# Patient Record
Sex: MALE | URBAN - NONMETROPOLITAN AREA
[De-identification: names, ages, dates, MRNs, and addresses within clinical notes are randomized per-mention and may not be internally consistent; named-entity substitution may affect disease eponyms.]

---

## 2019-05-23 ENCOUNTER — DOCTOR'S OFFICE (OUTPATIENT)
Dept: URBAN - NONMETROPOLITAN AREA CLINIC 1 | Facility: CLINIC | Age: 49
Setting detail: OPHTHALMOLOGY
End: 2019-05-23
Payer: MEDICARE

## 2019-05-23 DIAGNOSIS — H25.13: ICD-10-CM

## 2019-05-23 DIAGNOSIS — E11.9: ICD-10-CM

## 2019-05-23 DIAGNOSIS — H53.123: ICD-10-CM

## 2019-05-23 DIAGNOSIS — H43.813: ICD-10-CM

## 2019-05-23 DIAGNOSIS — H43.812: ICD-10-CM

## 2019-05-23 DIAGNOSIS — H43.811: ICD-10-CM

## 2019-05-23 PROCEDURE — 92134 CPTRZ OPH DX IMG PST SGM RTA: CPT | Performed by: OPHTHALMOLOGY

## 2019-05-23 PROCEDURE — 92004 COMPRE OPH EXAM NEW PT 1/>: CPT | Performed by: OPHTHALMOLOGY

## 2019-05-23 PROCEDURE — 92225 OPHTHALMOSCOPY EXTENDED INITIAL: CPT | Performed by: OPHTHALMOLOGY

## 2019-05-23 PROCEDURE — 92226 OPHTHALMOSCOPY EXT SUBSEQUENT: CPT | Performed by: OPHTHALMOLOGY

## 2019-05-23 ASSESSMENT — VISUAL ACUITY
OD_BCVA: 20/20
OS_BCVA: 20/20

## 2019-05-23 ASSESSMENT — REFRACTION_CURRENTRX
OS_OVR_VA: 20/
OD_OVR_VA: 20/
OD_OVR_VA: 20/
OS_OVR_VA: 20/
OS_OVR_VA: 20/
OD_OVR_VA: 20/

## 2019-05-23 ASSESSMENT — REFRACTION_MANIFEST
OD_VA2: 20/
OS_VA1: 20/
OS_VA3: 20/
OS_VA2: 20/
OD_VA1: 20/
OS_VA2: 20/
OD_VA1: 20/
OS_VA3: 20/
OU_VA: 20/
OS_VA1: 20/
OD_VA3: 20/
OD_VA3: 20/
OD_VA2: 20/
OU_VA: 20/

## 2019-05-23 ASSESSMENT — CONFRONTATIONAL VISUAL FIELD TEST (CVF)
OD_FINDINGS: FULL
OS_FINDINGS: FULL

## 2021-04-08 DIAGNOSIS — Z23 ENCOUNTER FOR IMMUNIZATION: ICD-10-CM

## 2021-09-16 ENCOUNTER — DOCTOR'S OFFICE (OUTPATIENT)
Dept: URBAN - NONMETROPOLITAN AREA CLINIC 1 | Facility: CLINIC | Age: 51
Setting detail: OPHTHALMOLOGY
End: 2021-09-16
Payer: COMMERCIAL

## 2021-09-16 DIAGNOSIS — H25.13: ICD-10-CM

## 2021-09-16 DIAGNOSIS — H43.813: ICD-10-CM

## 2021-09-16 DIAGNOSIS — E11.9: ICD-10-CM

## 2021-09-16 DIAGNOSIS — H53.123: ICD-10-CM

## 2021-09-16 PROCEDURE — 92134 CPTRZ OPH DX IMG PST SGM RTA: CPT | Performed by: OPHTHALMOLOGY

## 2021-09-16 PROCEDURE — 92201 OPSCPY EXTND RTA DRAW UNI/BI: CPT | Performed by: OPHTHALMOLOGY

## 2021-09-16 PROCEDURE — 92014 COMPRE OPH EXAM EST PT 1/>: CPT | Performed by: OPHTHALMOLOGY

## 2021-09-16 ASSESSMENT — VISUAL ACUITY
OS_BCVA: 20/20-1
OD_BCVA: 20/20

## 2021-09-16 ASSESSMENT — CONFRONTATIONAL VISUAL FIELD TEST (CVF)
OS_FINDINGS: FULL
OD_FINDINGS: FULL

## 2023-06-14 ENCOUNTER — CONSULT (OUTPATIENT)
Dept: PAIN MEDICINE | Facility: CLINIC | Age: 53
End: 2023-06-14
Payer: COMMERCIAL

## 2023-06-14 VITALS
DIASTOLIC BLOOD PRESSURE: 84 MMHG | HEART RATE: 80 BPM | SYSTOLIC BLOOD PRESSURE: 128 MMHG | WEIGHT: 278.6 LBS | TEMPERATURE: 97.2 F | RESPIRATION RATE: 20 BRPM | BODY MASS INDEX: 39 KG/M2 | HEIGHT: 71 IN

## 2023-06-14 DIAGNOSIS — E11.9 TYPE 2 DIABETES MELLITUS WITHOUT COMPLICATION, WITHOUT LONG-TERM CURRENT USE OF INSULIN (HCC): ICD-10-CM

## 2023-06-14 DIAGNOSIS — M54.12 CERVICAL RADICULOPATHY: Primary | ICD-10-CM

## 2023-06-14 PROCEDURE — 99204 OFFICE O/P NEW MOD 45 MIN: CPT | Performed by: ANESTHESIOLOGY

## 2023-06-14 RX ORDER — ATORVASTATIN CALCIUM 10 MG/1
10 TABLET, FILM COATED ORAL
COMMUNITY
Start: 2023-04-12

## 2023-06-14 RX ORDER — OXYBUTYNIN CHLORIDE 10 MG/1
10 TABLET, EXTENDED RELEASE ORAL
COMMUNITY
Start: 2023-04-12

## 2023-06-14 RX ORDER — METOPROLOL SUCCINATE 50 MG/1
1.5 TABLET, EXTENDED RELEASE ORAL DAILY
COMMUNITY
Start: 2023-04-12

## 2023-06-14 RX ORDER — VALSARTAN 320 MG/1
1 TABLET ORAL DAILY
COMMUNITY
Start: 2023-04-13

## 2023-06-14 RX ORDER — AMLODIPINE BESYLATE 5 MG/1
5 TABLET ORAL DAILY
COMMUNITY
Start: 2023-04-12 | End: 2024-04-11

## 2023-06-14 RX ORDER — LISINOPRIL 10 MG/1
TABLET ORAL
COMMUNITY
Start: 2023-03-14

## 2023-06-14 RX ORDER — PANTOPRAZOLE SODIUM 40 MG/1
1 TABLET, DELAYED RELEASE ORAL EVERY EVENING
COMMUNITY
Start: 2023-04-12

## 2023-06-14 RX ORDER — GABAPENTIN 300 MG/1
300 CAPSULE ORAL 3 TIMES DAILY
Qty: 90 CAPSULE | Refills: 2 | Status: SHIPPED | OUTPATIENT
Start: 2023-06-14

## 2023-06-14 RX ORDER — DULOXETIN HYDROCHLORIDE 60 MG/1
60 CAPSULE, DELAYED RELEASE ORAL DAILY
COMMUNITY
Start: 2023-04-12

## 2023-06-14 NOTE — PROGRESS NOTES
Assessment  1  Cervical radiculopathy - Left  -     Ambulatory referral to Physical Therapy; Future  -     gabapentin (NEURONTIN) 300 mg capsule; Take 1 capsule (300 mg total) by mouth 3 (three) times a day  -     MRI cervical spine without contrast; Future; Expected date: 06/14/2023    2  Type 2 diabetes mellitus without complication, without long-term current use of insulin (HCC)    Left, greater than right sided cervical radicular pain in C6 and C7 dermatomal distribution accompanied by pain limited weakness numbness and paresthesias  Patient has not yet been a full participant with PT recently  Chronic pain with decreased participation with IADLs over the past few months  Has been taking NSAIDs and tylenol in addition to cymbalta with modest benefit  5/5 strength bilaterally in upper extremities with AROM, positive spurling's maneuver left sided  Additionally there is positive cervical facet loading eliciting pain, left greater than right  Negative España's, denies any gait instability, saddle anesthesia  CT cervical spine from 2015 reviewed showing mild multilevel spondylotic changes  There is bilateral neural foraminal narrowing at C5-6 as a result of uncovertebral joint hypertrophy  No cord compression or cord signal abnormality  Risks, benefits alternatives to epidural steroid injections thoroughly discussed with patient  Handouts provided questions answered to patient's satisfaction  Lifestyle modifications extensively discussed including sleep hygiene, neck posture, diet, exercise and weight loss in conjunction with PT    Will proceed with multimodal pain therapy plan as noted below:    Plan  -MRI cervical spine noncontrast; will f/u result with patient (pacemaker ICD that is MRI conditional as per cards presented) should present cards to MRI tech on day of procedure to verify this  -gabapentin 300 mg t i d  Ordered for patient; counseled regarding sedative effects of taking this medication and provided up titration calendar  Counseled not to take medication while driving or operating heavy machinery/using stairs  -script provided for formal physical therapy for left sided cervical radiculopathy; Physician directed home exercise plan as per [employer] demonstrated and handouts provided that patient plans to participate with for 1 hour, twice a week for the next 6 weeks  There are risks associated with opioid medications, including dependence, addiction and tolerance  The patient understands and agrees to use these medications only as prescribed  Potential side effects of the medications include, but are not limited to, constipation, drowsiness, addiction, impaired judgment and risk of fatal overdose if not taken as prescribed  The patient was warned against driving while taking sedation medications or operating heavy machinery  The patient voiced understanding  Sharing medications is a felony  At this point in time, the patient is showing no signs of addiction, abuse, diversion or suicidal ideation  [place] Prescription Drug Monitoring Program report was reviewed and was appropriate      Complete risks and benefits including bleeding, infection, tissue reaction, nerve injury and allergic reaction were discussed  The approach was demonstrated using models and literature was provided  Verbal and written consent was obtained  My impressions and treatment recommendations were discussed in detail with the patient who verbalized understanding and had no further questions  Discharge instructions were provided  I personally saw and examined the patient and I agree with the above discussed plan of care      New Medications Ordered This Visit   Medications   • Multiple Vitamins-Minerals (MULTIVITAMIN ADULT EXTRA C PO)     Sig: Take by mouth   • Lansoprazole (PREVACID PO)     Sig: one daily   • TURMERIC PO     Sig: Take 500 mg by mouth 2 (two) times a day   • VITAMIN E PO     Sig: Take 400 Units by mouth • amLODIPine (NORVASC) 5 mg tablet     Sig: Take 5 mg by mouth daily   • atorvastatin (LIPITOR) 10 mg tablet     Sig: Take 10 mg by mouth   • metoprolol succinate (TOPROL-XL) 50 mg 24 hr tablet     Si 5 tablets daily   • valsartan (Diovan) 320 MG tablet     Sig: Take 1 tablet by mouth daily   • DULoxetine (CYMBALTA) 60 mg delayed release capsule     Sig: Take 60 mg by mouth daily   • metFORMIN (GLUCOPHAGE) 500 mg tablet     Sig: Take 1 tablet by mouth 2 (two) times a day with meals   • pantoprazole (PROTONIX) 40 mg tablet     Sig: Take 1 tablet by mouth every evening   • oxybutynin (DITROPAN-XL) 10 MG 24 hr tablet     Sig: Take 10 mg by mouth   • cyanocobalamin (VITAMIN B-12) 500 MCG tablet     Sig: Take 500 mcg by mouth daily   • lisinopril (ZESTRIL) 10 mg tablet   • gabapentin (NEURONTIN) 300 mg capsule     Sig: Take 1 capsule (300 mg total) by mouth 3 (three) times a day     Dispense:  90 capsule     Refill:  2       History of Present Illness    Fred Cordero is a 45 y o  male with pmhx of cardiomyopathy with pacemaker ICD, DM-2 (noninsulin dependent), HTN, XOL presenting with left sided cervical radicular pain in C6 and C7 dermatomal distribution accompanied by pain limited weakness numbness and paresthesias  Patient has not been a full participant with PT  Chronic pain with decreased participation with IADLs over the past few months  Has been taking NSAIDs and tylenol infrequently with modest benefit  The pain contributes to significant disability in participation with independent activities of daily living and is accompanied by pain limited weakness numbness and paresthesias that are debilitating in nature  The patient describes that overhead maneuvers such as combing hair is significantly limiting with respect to strength in the left arm/hand  He has trialed conservative measures including nsaids and tylenol for the pain but has not trialed any steroids or gabapentin    He has never had interventional pain procedures in the past including any cervical interlaminar epidural steroid injections in the past for his pain  He denies any saddle anesthesia, gait abnormality or bowel/bladder abnormality  I have personally reviewed and/or updated the patient's past medical history, past surgical history, family history, social history, current medications, allergies, and vital signs today  Review of Systems   Constitutional: Positive for activity change  HENT: Negative  Eyes: Negative  Respiratory: Negative  Cardiovascular: Negative  Gastrointestinal: Negative  Endocrine: Negative  Genitourinary: Negative  Musculoskeletal: Positive for arthralgias, myalgias, neck pain and neck stiffness  Negative for back pain and gait problem  Skin: Negative  Allergic/Immunologic: Negative  Neurological: Positive for weakness and numbness  Hematological: Negative  Psychiatric/Behavioral: Negative  All other systems reviewed and are negative  Patient Active Problem List   Diagnosis   • Cervical radiculopathy   • Type 2 diabetes mellitus without complication, without long-term current use of insulin ([facility] )       Past Medical History:   Diagnosis Date   • Cardiomyopathy ([facility] )    • GERD (gastroesophageal reflux disease)    • Pacemaker 2013   • Spinal stenosis        History reviewed  No pertinent surgical history      Family History   Problem Relation Age of Onset   • Colon cancer Mother    • Prostate cancer Father        Social History     Occupational History   • Not on file   Tobacco Use   • Smoking status: Never   • Smokeless tobacco: Never   Vaping Use   • Vaping Use: Never used   Substance and Sexual Activity   • Alcohol use: Yes     Comment: social   • Drug use: Never   • Sexual activity: Not on file       Current Outpatient Medications on File Prior to Visit   Medication Sig   • amLODIPine (NORVASC) 5 mg tablet Take 5 mg by mouth daily   • atorvastatin (LIPITOR) 10 mg "tablet Take 10 mg by mouth   • cyanocobalamin (VITAMIN B-12) 500 MCG tablet Take 500 mcg by mouth daily   • DULoxetine (CYMBALTA) 60 mg delayed release capsule Take 60 mg by mouth daily   • Lansoprazole (PREVACID PO) one daily   • metFORMIN (GLUCOPHAGE) 500 mg tablet Take 1 tablet by mouth 2 (two) times a day with meals   • metoprolol succinate (TOPROL-XL) 50 mg 24 hr tablet 1 5 tablets daily   • Multiple Vitamins-Minerals (MULTIVITAMIN ADULT EXTRA C PO) Take by mouth   • oxybutynin (DITROPAN-XL) 10 MG 24 hr tablet Take 10 mg by mouth   • pantoprazole (PROTONIX) 40 mg tablet Take 1 tablet by mouth every evening   • TURMERIC PO Take 500 mg by mouth 2 (two) times a day   • valsartan (Diovan) 320 MG tablet Take 1 tablet by mouth daily   • VITAMIN E PO Take 400 Units by mouth   • lisinopril (ZESTRIL) 10 mg tablet  (Patient not taking: Reported on 6/14/2023)     No current facility-administered medications on file prior to visit  Allergies   Allergen Reactions   • Cyclobenzaprine Anaphylaxis   • Lidocaine Anaphylaxis   • Penicillins Anaphylaxis and Hives   • Sulfa Antibiotics Anaphylaxis, Hives and Rash   • Tramadol Anaphylaxis and Angioedema   • Terbinafine Other (See Comments)     Transaminitis     Physical Exam    /84   Pulse 80   Temp (!) 97 2 °F (36 2 °C)   Resp 20   Ht 5' 11\" (1 803 m)   Wt 126 kg (278 lb 9 6 oz)   BMI 38 86 kg/m²     Constitutional: normal, well developed, well nourished, alert, in no distress and non-toxic and no overt pain behavior  and obese  Eyes: anicteric  HEENT: grossly intact  Neck: supple, symmetric, trachea midline and no masses   Pulmonary:even and unlabored  Cardiovascular:No edema or pitting edema present  Skin:Normal without rashes or lesions and well hydrated  Psychiatric:Mood and affect appropriate  Neurologic:Cranial Nerves II-XII grossly intact Sensation grossly intact; no clonus negative pena's  Reflexes 2+ and brisk   Spurling's maneuver positive left sided " Musculoskeletal:normal gait  5/5 strength bilaterally with AROM in upper extremities  Significant pain with cervical facet loading bilaterally and with lateral spine rotation, left greater than right  ttp over cervical paraspinal muscles  Negative derrek's test, negative gaenslen's negative SIJ loading bilaterally  Imaging    CT cervical spine noncontrast 2015    History: Cervical spine injury     Technique: Axial CT images of the cervical spine were performed without   intravenous contrast  Sagittal and coronal reconstructions were formatted  Comparison: None     Findings:  The craniocervical junction is intact  There is degenerative   narrowing of the predental space  The odontoid is intact  There is   straightening of the normal cervical lordosis  There is no subluxation  There   are mild multilevel spondylotic changes  There is bilateral neural foraminal   narrowing at C5-6 as a result of uncovertebral joint hypertrophy  CT is   suboptimal for evaluation of neural foramen and central canal, which can be   further evaluated with MRI  There is no prevertebral soft tissue swelling  No   fractures are identified

## 2023-06-14 NOTE — PATIENT INSTRUCTIONS
Neck Exercises   AMBULATORY CARE:   Neck exercises  help reduce neck pain, and improve neck movement and strength  Neck exercises also help prevent long-term neck problems  Call your doctor if:   Your pain does not get better, or gets worse  You have questions or concerns about your condition, care, or exercise program     What you need to know about exercise safety:   Move slowly, gently, and smoothly  Avoid fast or jerky motions  Stand and sit the way your healthcare provider shows you  Good posture may reduce your neck pain  Check your posture often, even when you are not doing your neck exercises  Follow the exercise program recommended by your healthcare provider  He or she will tell you which exercises are best for your condition  He or she will also tell you how many repetitions to do and how often you should do the exercises  How to perform neck exercises safely:   Exercise position:  You may sit or stand while you do neck exercises  Face forward  Your shoulders should be straight and relaxed, with a good posture  Head tilts, forward and back:  Gently bow your head and try to touch your chin to your chest  Your healthcare provider may tell you to push on the back of your neck to help bow your head  Raise your chin back to the starting position  Tilt your head back as far as possible so you are looking up at the ceiling  Your healthcare provider may tell you to lift your chin to help tilt your head back  Return your head to the starting position  Head tilts, side to side:  Tilt your head, bringing your ear toward your shoulder  Then tilt your head toward the other shoulder  Head turns:  Turn your head to look over your shoulder  Tilt your chin down and try to touch it to your shoulder  Do not raise your shoulder to your chin  Face forward again  Do the same on the other side           Head rolls:  Slowly bring your chin toward your chest  Next, roll your head to the right  Your ear should be positioned over your shoulder  Hold this position for 5 seconds  Roll your head back toward your chest and to the left into the same position  Hold for 5 seconds  Gently roll your head back and around in a clockwise [place] 3 times  Next, move your head in the reverse direction (counterclockwise) in a [place] 3 times  Do not shrug your shoulders upwards while you do this exercise  Follow up with your doctor as directed:  Write down your questions so you remember to ask them during your visits  © Copyright [employer] 2022 Information is for End User's use only and may not be sold, redistributed or otherwise used for commercial purposes  The above information is an  only  It is not intended as medical advice for individual conditions or treatments  Talk to your doctor, nurse or pharmacist before following any medical regimen to see if it is safe and effective for you

## 2023-06-29 ENCOUNTER — TELEPHONE (OUTPATIENT)
Dept: MRI IMAGING | Facility: HOSPITAL | Age: 53
End: 2023-06-29

## 2023-06-29 ENCOUNTER — TELEPHONE (OUTPATIENT)
Dept: PAIN MEDICINE | Facility: CLINIC | Age: 53
End: 2023-06-29

## 2023-06-29 DIAGNOSIS — M54.12 CERVICAL RADICULOPATHY: Primary | ICD-10-CM

## 2023-06-29 NOTE — TELEPHONE ENCOUNTER
Per [employer], MRI unsafe device  Sent in-basket message to ordering provider and left voicemail for pt

## 2023-06-29 NOTE — TELEPHONE ENCOUNTER
"----- Message from Anai Barkley sent at 6/28/2023  6:01 PM EDT -----  Hello,  You recently ordered an MRI for this patient who has a [employer] CIED  Per the , one of his leads is non-conditional, meaning it is not approved for MRI  I have canceled the order and flagged the patient's chart as \"MRI Unsafe\"  I left a voicemail for him to inform him of this       Thanks you,   -Cheryle Stagger R T  (MERVIN )(MR)TANISHA ARREOLA [facility] MR   [address] [facility]  [phone]      "

## 2023-07-07 ENCOUNTER — TELEPHONE (OUTPATIENT)
Dept: PAIN MEDICINE | Facility: CLINIC | Age: 53
End: 2023-07-07

## 2023-07-07 ENCOUNTER — PREP FOR PROCEDURE (OUTPATIENT)
Dept: PAIN MEDICINE | Facility: CLINIC | Age: 53
End: 2023-07-07

## 2023-07-07 DIAGNOSIS — M54.12 CERVICAL RADICULOPATHY: Primary | ICD-10-CM

## 2023-07-07 NOTE — TELEPHONE ENCOUNTER
Spoke with patient and scheduled C7-T1 ILESI. Patient aware of instructions. No food/drink one hour prior. Wear comfortable clothing. A  is required. Denies blood thinners. Continue all other prescribed medications on day of procedure. Call if prescribed an antibiotic or become sick. Refrain from vaccinations two weeks prior and two weeks after. Patient aware [facility] nurse will call day prior with instructions and report time. Call with questions.

## 2023-07-11 ENCOUNTER — EVALUATION (OUTPATIENT)
Dept: PHYSICAL THERAPY | Facility: CLINIC | Age: 53
End: 2023-07-11
Payer: COMMERCIAL

## 2023-07-11 DIAGNOSIS — M54.12 CERVICAL RADICULOPATHY: Primary | ICD-10-CM

## 2023-07-11 PROCEDURE — 97110 THERAPEUTIC EXERCISES: CPT | Performed by: PHYSICAL THERAPIST

## 2023-07-11 PROCEDURE — 97162 PT EVAL MOD COMPLEX 30 MIN: CPT | Performed by: PHYSICAL THERAPIST

## 2023-07-11 NOTE — PROGRESS NOTES
PT Evaluation     Today's date: 2023  Patient name: Miriam Terrazas  : 1970  MRN: [id number]  Referring provider: Tatyana Sanford MD  Dx:   Encounter Diagnosis     ICD-10-CM    1. Cervical radiculopathy - Left  M54.12 Ambulatory referral to Physical Therapy                     Assessment   Assessment details: Miriam Terrazas is a pleasant 45 y.o. male presenting to PT with cc of acute neck pain, stiffness, and LUE radicular symptoms. Pt. States pain began ~10 years and has been intermittent until recent worsening 3 months ago. Upon examination, patient was found to have objective deficits as listed below and is displaying ss consistent with c L sided C7 radiculopathy. Pt. Is experiencing subsequent functional deficits including difficulty difficulty working as , lifting and working overhead, and driving. Pt. Was educated on role of PT to address issues and given initial treatment focusing on AROM with HEP. Pt. Would benefit from skilled physical therapy to promote improved function and maximize activity tolerance. Symptom irritability: highUnderstanding of Dx/Px/POC: excellent  Goals  STG:   -Pt. Will demonstrate appropriate postural positioning to improve work task tolerance in 2 visits  -Pt. Will demonstrate subcranial retraction improvement of 25%  -Pt. Will demonstrate cervical B sidebending AROM 25%    LT weeks  -Pt. Will demonstrate scapular depression strength WNL  -Pt. Will demonstrate C/S AROM WNL  -Pt.  Will demonstrate I with HEP upon [address] details: Reduce soft tissue irritation/tension -> Improve AROM and postural awareness -> Improve scapular and cervical stabilization -> Improve functional performance focusing on appropriate technique/mechanics    Patient would benefit from: skilled physical therapy  Planned modality interventions: cryotherapy, high voltage pulsed current: spasm management, high voltage pulsed current: pain management, unattended electrical stimulation and thermotherapy: hydrocollator packs  Planned therapy interventions: abdominal trunk stabilization, activity modification, body mechanics training, flexibility, functional ROM exercises, graded exercise, home exercise program, therapeutic exercise, therapeutic activities, stretching, strengthening, postural training, patient education, neuromuscular re-education, manual therapy, joint mobilization and massage  Frequency: 1x week  Duration in weeks: 6  Plan of Care beginning date:23   Plan of Care expiration date: 2023  Treatment plan discussed with: patient         Subjective Evaluation     History of Present Illness  Mechanism of injury: Morteza Mortensen presents to PT with cc of neck pain and LUE radicular pain for past 3 months. He notes long standing history of intermittent radicular pain but most recent bout has been since April and is more severe than prior. He describes the pain as a nagging and is worsened with cervical extension. Denies headaches. Denies weakness. Notes n/t extends into R 1, 2, 3 digits. Pt. Recently began gabapentin. He is scheduled for TAYLOR with Dr. Sergey Jauregui in 1 week.    Pain  Current pain ratin  At best pain ratin  At worst pain rating: 10  Quality: tight, radiating, pulling, pressure, knife-like, grinding and discomfort  Relieving factors: ice, medications and heat  Aggravating factors: lifting  Progression: worsening     Social Support     Employment status: full time   Hand dominance: right     Diagnostic Tests  Mri and ct pending  Treatments  Previous treatment: chiro worsening  Current treatment: gabapentin  Patient Goals  Patient goals for therapy: decreased pain, increased motion, return to sport/leisure activities, independence with ADLs/IADLs and increased strength              Objective      Concurrent Complaints  Negative for disturbed sleep, dizziness, faints and headaches     Postural Observations  Seated posture: fair  Standing posture: fair           Tenderness   Cervical Spine   Tenderness in the spinous process. Neurological Testing      Sensation   Cervical/Thoracic   Left   Intact: light touch     Right   Intact: light touch     Reflexes   Left   Biceps (C5/C6): normal (2+)  Triceps (C7): normal (2+)     Right   Biceps (C5/C6): normal (2+)  Triceps (C7): normal (2+)     Active Range of Motion   Cervical/Thoracic Spine         Cervical     Subcranial retraction:  reduced approx 25%  Flexion:  wfl  Extension:  reduced approx 50% with worsening symptoms  Left lateral flexion:  reduced approx 25% wit hworsening symptoms  Right lateral flexion:  wfl  Left rotation:  wfl  Right rotation:  wfl     Joint Play     Hypomobile: C6, C7, T1, T2, T6 and T7     Pain: C6, C7, T1, T2, T6 and T7      Strength/Myotome Testing   Cervical Spine      Left   Normal strength     Right   Normal strength     Tests   Cervical   Positive vertical compression, cervical distraction,   Negative alar ligament test and transverse ligaent test.      Left Shoulder   Positive ULTT1.       Additional Tests Details  Minimal diaphragmatic breathing with increased scalene tone     General Comments:     Upper quarter screen   Shoulder: unremarkable  Elbow: unremarkable  Hand/wrist: unremarkable  Neuro Exam:      Headaches   Patient reports headaches: No.           Flowsheet Rows    Flowsheet Row Most Recent Value   PT/OT G-Codes    Current Score 63   Projected Score 70   FOTO information reviewed Yes             Precautions: Pacemaker No ESTIM     Daily Treatment Diary:      Initial Evaluation Date: 07/11/23  Compliance 7/11                     Visit Number 1                    Re-Eval  IE                 [address] [facility]                           7/11                     Manual                      CT mobz -                     Nerve gldies -                                           Ther-Ex                      C/s arom - mhp -                     butterflies 10x10"                     Arm lengthener -                     Prayer stretch 4x30"                     Doorway stretch 4x30"                     Open books 10x10"                                                                                       Neuro Re-Ed                                                                                                Ther-Act                                                               Modalities                      TRACTION  nv

## 2023-07-20 ENCOUNTER — OFFICE VISIT (OUTPATIENT)
Dept: PHYSICAL THERAPY | Facility: CLINIC | Age: 53
End: 2023-07-20
Payer: COMMERCIAL

## 2023-07-20 DIAGNOSIS — M54.12 CERVICAL RADICULOPATHY: Primary | ICD-10-CM

## 2023-07-20 PROCEDURE — 97110 THERAPEUTIC EXERCISES: CPT

## 2023-07-20 PROCEDURE — 97140 MANUAL THERAPY 1/> REGIONS: CPT

## 2023-07-20 NOTE — PROGRESS NOTES
Daily Note     Today's date: 2023  Patient name: Ivy Julio  : 1970  MRN: [id number]  Referring provider: Deepthi Agrawal MD  Dx:   Encounter Diagnosis     ICD-10-CM    1. Cervical radiculopathy - Left  M54.12                      Subjective: Patient reports 3/10 neck pain that goes down the L UE to the elbow. Denies tingling in finger tips today. He says he had quite a bit of soreness following his initial evaluation. He has ibng using ice and had some relief. He notes he was supposed to get injection on Tuesday, but insurance denied it last minute and says they would like to see 6-8 weeks of PT first.      Objective: See treatment diary below      Assessment: Tolerated treatment well without significant change in L UE radicular symptoms through exercises. Patient demonstrated significant tightness in periscapular region with MT. Some L UE symptom reduction following mechanical tx. Patient would benefit from continued PT to reduce cervical radicular symptoms for improved function and activity tolerance. Plan: Continue per plan of care.       Precautions: Pacemaker No ESTIM     Daily Treatment Diary:      Initial Evaluation Date: 23  Compliance                    Visit Number 1 2                   Re-Eval  IE                 [address] [facility] [address]                                              Manual                      CT mobz -  L periscap stm 8 min                   Nerve gldies -  5m                                         Ther-Ex                      C/s arom -  15x                   mhp -  np (prefers ice)                   butterflies 10x10"  10"x10                   Arm lengthener -  10"x10                   Prayer stretch 4x30" 4x30"                   Doorway stretch 4x30" 4x30"                   Open books 10x10"  10"x10                                                                                     Neuro Re-Ed Ther-Act                                                               Modalities                      TRACTION  nv 13# 15 min

## 2023-07-25 ENCOUNTER — OFFICE VISIT (OUTPATIENT)
Dept: PHYSICAL THERAPY | Facility: CLINIC | Age: 53
End: 2023-07-25
Payer: COMMERCIAL

## 2023-07-25 DIAGNOSIS — M54.12 CERVICAL RADICULOPATHY: Primary | ICD-10-CM

## 2023-07-25 PROCEDURE — 97110 THERAPEUTIC EXERCISES: CPT | Performed by: PHYSICAL THERAPIST

## 2023-07-25 PROCEDURE — 97140 MANUAL THERAPY 1/> REGIONS: CPT | Performed by: PHYSICAL THERAPIST

## 2023-07-25 NOTE — PROGRESS NOTES
Daily Note     Today's date: 2023  Patient name: Hoa De Leon  : 1970  MRN: [id number]  Referring provider: Henrry Sepulveda MD  Dx:   Encounter Diagnosis     ICD-10-CM    1. Cervical radiculopathy - Left  M54.12                      Subjective: Rich notes therapy helped reduce symptoms last session. Objective: See treatment diary below      Assessment: Tolerated treatment well. Patient demonstrated fatigue post treatment, exhibited good technique with therapeutic exercises and would benefit from continued PT      Plan: Continue per plan of care. Progress treatment as tolerated.        Precautions: Pacemaker No ESTIM     Daily Treatment Diary:      Initial Evaluation Date: 23  Compliance                  Visit Number 1 2  3                 Re-Eval  IE   -              MC   Foto Captured Y   -                                         Manual                      CT mobz -  L periscap stm 8 min                   Nerve gldies -  5m                                         Ther-Ex                      C/s arom -  15x  15x ea                 mhp -  np (prefers ice)  -                 butterflies 10x10"  10"x10  10x10"                 Arm lengthener -  10"x10  10x10"                 Prayer stretch 4x30" 4x30"  4x30"                 Doorway stretch 4x30" 4x30"  4x30"                 Open books 10x10"  10"x10  10x10"                 Chin tuck - - Supine 10x10"                                       ube - - 3'/3'                 Neuro Re-Ed                                                                                                Ther-Act                                                               Modalities                      TRACTION  nv 13# 15 min 18# 10'

## 2023-08-01 ENCOUNTER — OFFICE VISIT (OUTPATIENT)
Dept: PHYSICAL THERAPY | Facility: CLINIC | Age: 53
End: 2023-08-01
Payer: COMMERCIAL

## 2023-08-01 DIAGNOSIS — M54.12 CERVICAL RADICULOPATHY: Primary | ICD-10-CM

## 2023-08-01 PROCEDURE — 97110 THERAPEUTIC EXERCISES: CPT

## 2023-08-01 PROCEDURE — 97140 MANUAL THERAPY 1/> REGIONS: CPT

## 2023-08-01 NOTE — PROGRESS NOTES
Daily Note     Today's date: 2023  Patient name: Harvey Huitron  : 1970  MRN: [id number]  Referring provider: Thomas Wellington MD  Dx:   Encounter Diagnosis     ICD-10-CM    1. Cervical radiculopathy - Left  M54.12           Start Time: 1630  Stop Time: 1730  Total time in clinic (min): 60 minutes    Subjective: Rich notes therapy helped reduce symptoms last session. Objective: See treatment diary below      Assessment: Tolerated treatment well. Patient noted improved radicular sx during cervical retraction with increased reps. Patient had good carryover with posture and mechanics after cuing. He had visual improvement in cervical ROM post AROM exercises. Patient demonstrated fatigue post treatment, exhibited good technique with therapeutic exercises and would benefit from continued PT to decrease radic sx, increased postural strength and mobility as tolerated. Plan: Continue per plan of care. Progress treatment as tolerated.        Precautions: Pacemaker No ESTIM     Daily Treatment Diary:      Initial Evaluation Date: 23  Compliance                Visit Number 1 2  3  4               Re-Eval  IE   -  -            MC   Foto Captured Y   -  -                                     Manual                      CT mobz -  L periscap stm 8 min    KL               Nerve gldies -  5m    self 5"x10                                     Ther-Ex                      C/s arom -  15x  15x ea  w/chin tuc 15x all               mhp -  np (prefers ice)  -                 butterflies 10x10"  10"x10  10x10"  10x10"               Arm lengthener -  10"x10  10x10"                Prayer stretch 4x30" 4x30"  4x30"   4x30"               Doorway stretch 4x30" 4x30"  4x30"  4x30"               Open books 10x10"  10"x10  10x10"   10x10"               Chin tuck - - Supine 10x10"  seated 3"x20                                     ube - - 3'/3'  held radic sx    Wall slides as warm up 15x (pulleys in use)               Neuro Re-Ed                                                                                                Ther-Act                                                               Modalities                      TRACTION  nv 13# 15 min 18# 10' 20# 10"

## 2023-08-07 ENCOUNTER — TELEPHONE (OUTPATIENT)
Dept: PAIN MEDICINE | Facility: CLINIC | Age: 53
End: 2023-08-07

## 2023-08-07 NOTE — TELEPHONE ENCOUNTER
Called patient left voicemail that unfortunately they were unable to fill OR schedule and he needed to be moved up to early afternoon.

## 2023-08-08 ENCOUNTER — OFFICE VISIT (OUTPATIENT)
Dept: PHYSICAL THERAPY | Facility: CLINIC | Age: 53
End: 2023-08-08
Payer: COMMERCIAL

## 2023-08-08 DIAGNOSIS — M54.12 CERVICAL RADICULOPATHY: Primary | ICD-10-CM

## 2023-08-08 PROCEDURE — 97110 THERAPEUTIC EXERCISES: CPT | Performed by: PHYSICAL THERAPIST

## 2023-08-08 NOTE — PROGRESS NOTES
Daily Note     Today's date: 2023  Patient name: Jude Marinelli  : 1970  MRN: [id number]  Referring provider: Sharri Seymour MD  Dx:   Encounter Diagnosis     ICD-10-CM    1. Cervical radiculopathy - Left  M54.12                      Subjective: Pt. Notes soreness in L arm following last session. Objective: See treatment diary below      Assessment: Jude Marinelli was progressed with PT interventions, focusing on appropriate technique and intensity. Pt. Required mod cuing from therapist to complete. Responded well to scap mobz and traction today. Pt. Would benefit from continued physical therapy to promote improved activity tolerance and function. Plan: Continue per plan of care. Progress treatment as tolerated.        Precautions: Pacemaker No ESTIM     Daily Treatment Diary:      Initial Evaluation Date: 23  Compliance              Visit Number 1 2  3  4  5             Re-Eval  IE   -  -  -          MC   Foto Captured Y   -  -  -                                 Manual                      CT mobz -  L periscap stm 8 min    KL  10' stm scap, c/s             Nerve gldies -  5m    self 5"x10                                     Ther-Ex                      C/s arom -  15x  15x ea  w/chin tuc 15x all  20x ea             mhp -  np (prefers ice)  -                 butterflies 10x10"  10"x10  10x10"  10x10"  10x10"             Arm lengthener -  10"x10  10x10"   20x             Prayer stretch 4x30" 4x30"  4x30"   4x30"  4x30"             Doorway stretch 4x30" 4x30"  4x30"  4x30"  4x30"             Open books 10x10"  10"x10  10x10"   10x10"  10x10"             Chin tuck - - Supine 10x10"  seated 3"x20  seated 5"20                                   ube - - 3'/3'  held radic sx    Wall slides as warm up 15x (pulleys in use)               Neuro Re-Ed                      Self caudal dist - - - - c lev scap 5x20" Ther-Act                                                               Modalities                      TRACTION  nv 13# 15 min 18# 10' 20# 10" 22# 10'

## 2023-08-09 NOTE — DISCHARGE INSTR - AVS FIRST PAGE
YOUR 2 WEEK FOLLOW UP HAS BEEN SCHEDULED; IF YOU WISH TO CHANGE THE FOLLOW UP, PLEASE CALL THE [facility] [place]: [phone]    Epidural Steroid Injection   WHAT YOU NEED TO KNOW:   An epidural steroid injection (TAYLOR) is a procedure to inject steroid medicine into the epidural space. The epidural space is between your spinal cord and vertebrae. Steroids reduce inflammation and fluid buildup in your spine that may be causing pain. You may be given pain medicine along with the steroids. DISCHARGE INSTRUCTIONS:   Call your local emergency number (911 in the [address]) if:   You have a seizure. You have trouble moving your legs. Seek care immediately if:   Blood soaks through your bandage. You have a fever or chills, severe back pain, and the procedure area is sensitive to the touch. You cannot control when you urinate or have a bowel movement. Call your doctor if:   You have weakness or numbness in your legs. Your wound is red, swollen, or draining pus. You have nausea or are vomiting. Your face or neck is red and you feel warm. You have more pain than you had before the procedure. You have swelling in your hands or feet. You have questions or concerns about your condition or care. Care for your wound as directed: You may remove the bandage before you go to bed the day of your procedure. You may take a shower, but do not take a bath for at least 24 hours. Self-care:   Do not drive,  use machines, or do strenuous activity for 24 hours after your procedure or as directed. Continue other treatments  as directed. Steroid injections alone will not control your pain. The injections are meant to be used with other treatments, such as physical therapy. Follow up with your doctor as directed:  Write down your questions so you remember to ask them during your visits.    © Copyright [employer]  Information is for End User's use only and may not be sold, redistributed or otherwise used for commercial purposes. The above information is an  only. It is not intended as medical advice for individual conditions or treatments. Talk to your doctor, nurse or pharmacist before following any medical regimen to see if it is safe and effective for you.

## 2023-08-10 ENCOUNTER — HOSPITAL ENCOUNTER (OUTPATIENT)
Facility: HOSPITAL | Age: 53
Setting detail: OUTPATIENT SURGERY
Discharge: HOME | End: 2023-08-10
Attending: ANESTHESIOLOGY | Admitting: ANESTHESIOLOGY
Payer: COMMERCIAL

## 2023-08-10 ENCOUNTER — APPOINTMENT (OUTPATIENT)
Dept: RADIOLOGY | Facility: HOSPITAL | Age: 53
End: 2023-08-10
Payer: COMMERCIAL

## 2023-08-10 VITALS
HEIGHT: 71 IN | DIASTOLIC BLOOD PRESSURE: 73 MMHG | OXYGEN SATURATION: 96 % | HEART RATE: 79 BPM | BODY MASS INDEX: 38.92 KG/M2 | RESPIRATION RATE: 20 BRPM | WEIGHT: 278 LBS | TEMPERATURE: 97.6 F | SYSTOLIC BLOOD PRESSURE: 133 MMHG

## 2023-08-10 LAB — GLUCOSE SERPL-MCNC: 203 MG/DL (ref 65–140)

## 2023-08-10 PROCEDURE — 62321 NJX INTERLAMINAR CRV/THRC: CPT | Performed by: ANESTHESIOLOGY

## 2023-08-10 PROCEDURE — 82948 REAGENT STRIP/BLOOD GLUCOSE: CPT

## 2023-08-10 RX ORDER — LIDOCAINE HYDROCHLORIDE 10 MG/ML
INJECTION, SOLUTION EPIDURAL; INFILTRATION; INTRACAUDAL; PERINEURAL AS NEEDED
Status: DISCONTINUED | OUTPATIENT
Start: 2023-08-10 | End: 2023-08-10 | Stop reason: HOSPADM

## 2023-08-10 RX ORDER — METHYLPREDNISOLONE ACETATE 80 MG/ML
INJECTION, SUSPENSION INTRA-ARTICULAR; INTRALESIONAL; INTRAMUSCULAR; SOFT TISSUE AS NEEDED
Status: DISCONTINUED | OUTPATIENT
Start: 2023-08-10 | End: 2023-08-10 | Stop reason: HOSPADM

## 2023-08-10 RX ORDER — SODIUM CHLORIDE 9 MG/ML
INJECTION INTRAVENOUS AS NEEDED
Status: DISCONTINUED | OUTPATIENT
Start: 2023-08-10 | End: 2023-08-10 | Stop reason: HOSPADM

## 2023-08-10 NOTE — OP NOTE
OPERATIVE REPORT  PATIENT NAME: Duy Wilson    :  1970  MRN: [id number]  Pt Location:  GI ROOM 01    SURGERY DATE: 8/10/2023    Surgeon(s) and Role: Tereso Kennedy MD - Primary    Preop Diagnosis:  Cervical radiculopathy [M54.12]    Post-Op Diagnosis Codes:     * Cervical radiculopathy [M54.12]    Procedure(s):  C7-T1 ILESI    Specimen(s):  * No specimens in log *    Estimated Blood Loss:   Minimal    Drains:  * No LDAs found *    Anesthesia Type:   Local    Operative Indications:  Cervical radiculopathy [M54.12]    Operative Findings:  C7-T1 epidurogram    Complications:   None    Procedure and Technique:  Please see detailed procedure note    I was present for the entire procedure.     Patient Disposition:  PACU     SIGNATURE: Tereso Kennedy MD  DATE: August 10, 2023  TIME: 1:08 PM

## 2023-08-10 NOTE — PROCEDURES
Pre-procedure Diagnosis: Cervical Radiculopathy  Post-procedure Diagnosis: Cervical Radiculopathy  Procedure Title(s):  1. C7-T1 interlaminar epidural steroid injection      2. Intraoperative fluoroscopy  Attending Surgeon:   Rocky Bonilla MD  Anesthesia:   Local     Indications: The patient is a 45y.o. year-old male with a diagnosis of Cervical Radiculopathy. The patient's history and physical exam were reviewed. The risks, benefits and alternatives to the procedure were discussed, and all questions were answered to the patient's satisfaction. The patient agreed to proceed, and written informed consent was obtained. Procedure in Detail: The patient was brought into the procedure room and placed in the prone position on the fluoroscopy table. The area of the cervical spine was prepped with chlorhexidine gluconate solution times one and draped in a sterile manner. The C7-T1 interspace was identified and marked under AP fluoroscopy. The skin and subcutaneous tissues in the area were anesthetized with 1% lidocaine. A 18-gauge Tuohy epidural needle was directed toward the interspace under fluoroscopic guidance until the ligamentum flavum was engaged. The C-arm was oblique to the right to obtain a contra-lateral oblique view. From this point, a loss of resistance technique with saline was used to identify entrance of the needle into the epidural space. Once an appropriate loss was obtained, negative aspiration was confirmed, and 1 ml Omnipaque 240 contrast solution was injected. An appropriate epidurogram was noted. Then, after negative aspiration, a solution consisting of 1-mL depo-medrol (80mg/mL) and 3-mL preservative-free saline was easily injected. The needle was removed with a 1% lidocaine flush. The patient's back was cleaned and a bandage was placed over the site of needle insertion. Disposition: The patient tolerated the procedure well, and there were no apparent complications.  The patient was taken to the recovery area where written discharge instructions for the procedure were given.      Estimated Blood Loss: None  Specimens Obtained: N/A

## 2023-08-10 NOTE — H&P
Assessment  1. Cervical radiculopathy - Left  -     Ambulatory referral to Physical Therapy; Future  -     gabapentin (NEURONTIN) 300 mg capsule; Take 1 capsule (300 mg total) by mouth 3 (three) times a day  -     MRI cervical spine without contrast; Future; Expected date: 06/14/2023    2. Type 2 diabetes mellitus without complication, without long-term current use of insulin (HCC)    Left, greater than right sided cervical radicular pain in C6 and C7 dermatomal distribution accompanied by pain limited weakness numbness and paresthesias. Patient has not yet been a full participant with PT recently. Chronic pain with decreased participation with IADLs over the past few months. Has been taking NSAIDs and tylenol in addition to cymbalta with modest benefit. 5/5 strength bilaterally in upper extremities with AROM, positive spurling's maneuver left sided. Additionally there is positive cervical facet loading eliciting pain, left greater than right. Negative España's, denies any gait instability, saddle anesthesia. CT cervical spine from 2015 reviewed showing mild multilevel spondylotic changes. There is bilateral neural foraminal narrowing at C5-6 as a result of uncovertebral joint hypertrophy. No cord compression or cord signal abnormality. Risks, benefits alternatives to epidural steroid injections thoroughly discussed with patient. Handouts provided questions answered to patient's satisfaction. Lifestyle modifications extensively discussed including sleep hygiene, neck posture, diet, exercise and weight loss in conjunction with PT. Will proceed with multimodal pain therapy plan as noted below:    Plan  -C7-T1 ILESI or alternate level; f/u 2 weeks post procedure  -gabapentin 300 mg t.i.d. Ordered for patient; counseled regarding sedative effects of taking this medication and provided up titration calendar.   Counseled not to take medication while driving or operating heavy machinery/using stairs  -script provided for formal physical therapy for left sided cervical radiculopathy; Physician directed home exercise plan as per [employer] demonstrated and handouts provided that patient plans to participate with for 1 hour, twice a week for the next 6 weeks. There are risks associated with opioid medications, including dependence, addiction and tolerance. The patient understands and agrees to use these medications only as prescribed. Potential side effects of the medications include, but are not limited to, constipation, drowsiness, addiction, impaired judgment and risk of fatal overdose if not taken as prescribed. The patient was warned against driving while taking sedation medications or operating heavy machinery. The patient voiced understanding. Sharing medications is a felony. At this point in time, the patient is showing no signs of addiction, abuse, diversion or suicidal ideation. [place] Prescription Drug Monitoring Program report was reviewed and was appropriate      Complete risks and benefits including bleeding, infection, tissue reaction, nerve injury and allergic reaction were discussed. The approach was demonstrated using models and literature was provided. Verbal and written consent was obtained. My impressions and treatment recommendations were discussed in detail with the patient who verbalized understanding and had no further questions. Discharge instructions were provided. I personally saw and examined the patient and I agree with the above discussed plan of care. No orders of the defined types were placed in this encounter. History of Present Illness    August Found is a 45 y.o. male with pmhx of cardiomyopathy with pacemaker ICD, DM-2 (noninsulin dependent), HTN, [place] presenting with left sided cervical radicular pain in C6 and C7 dermatomal distribution accompanied by pain limited weakness numbness and paresthesias. Patient has not been a full participant with PT.  Chronic pain with decreased participation with IADLs over the past few months. Has been taking NSAIDs and tylenol infrequently with modest benefit. The pain contributes to significant disability in participation with independent activities of daily living and is accompanied by pain limited weakness numbness and paresthesias that are debilitating in nature. The patient describes that overhead maneuvers such as combing hair is significantly limiting with respect to strength in the left arm/hand. He has trialed conservative measures including nsaids and tylenol for the pain but has not trialed any steroids or gabapentin. He has never had interventional pain procedures in the past including any cervical interlaminar epidural steroid injections in the past for his pain. He denies any saddle anesthesia, gait abnormality or bowel/bladder abnormality. I have personally reviewed and/or updated the patient's past medical history, past surgical history, family history, social history, current medications, allergies, and vital signs today. Review of Systems   Constitutional: Positive for activity change. HENT: Negative. Eyes: Negative. Respiratory: Negative. Cardiovascular: Negative. Gastrointestinal: Negative. Endocrine: Negative. Genitourinary: Negative. Musculoskeletal: Positive for arthralgias, myalgias, neck pain and neck stiffness. Negative for back pain and gait problem. Skin: Negative. Allergic/Immunologic: Negative. Neurological: Positive for weakness and numbness. Hematological: Negative. Psychiatric/Behavioral: Negative. All other systems reviewed and are negative.       Patient Active Problem List   Diagnosis   • Cervical radiculopathy   • Type 2 diabetes mellitus without complication, without long-term current use of insulin ([address])       Past Medical History:   Diagnosis Date   • Cardiomyopathy ([address])    • GERD (gastroesophageal reflux disease)    • Pacemaker 2013   • Spinal stenosis        History reviewed. No pertinent surgical history. Family History   Problem Relation Age of Onset   • Colon cancer Mother    • Prostate cancer Father        Social History     Occupational History   • Not on file   Tobacco Use   • Smoking status: Never   • Smokeless tobacco: Never   Vaping Use   • Vaping Use: Never used   Substance and Sexual Activity   • Alcohol use: Yes     Comment: social   • Drug use: Never   • Sexual activity: Not on file       No current facility-administered medications on file prior to encounter.      Current Outpatient Medications on File Prior to Encounter   Medication Sig   • amLODIPine (NORVASC) 5 mg tablet Take 5 mg by mouth daily   • atorvastatin (LIPITOR) 10 mg tablet Take 10 mg by mouth   • cyanocobalamin (VITAMIN B-12) 500 MCG tablet Take 500 mcg by mouth daily   • DULoxetine (CYMBALTA) 60 mg delayed release capsule Take 60 mg by mouth daily   • gabapentin (NEURONTIN) 300 mg capsule Take 1 capsule (300 mg total) by mouth 3 (three) times a day   • metFORMIN (GLUCOPHAGE) 500 mg tablet Take 1 tablet by mouth 2 (two) times a day with meals   • metoprolol succinate (TOPROL-XL) 50 mg 24 hr tablet 1.5 tablets daily   • Multiple Vitamins-Minerals (MULTIVITAMIN ADULT EXTRA C PO) Take by mouth   • oxybutynin (DITROPAN-XL) 10 MG 24 hr tablet Take 10 mg by mouth   • pantoprazole (PROTONIX) 40 mg tablet Take 1 tablet by mouth every evening   • TURMERIC PO Take 500 mg by mouth 2 (two) times a day   • valsartan (Diovan) 320 MG tablet Take 1 tablet by mouth daily   • VITAMIN E PO Take 400 Units by mouth   • Lansoprazole (PREVACID PO) one daily   • lisinopril (ZESTRIL) 10 mg tablet  (Patient not taking: Reported on 6/14/2023)       Allergies   Allergen Reactions   • Cyclobenzaprine Anaphylaxis   • Lidocaine Anaphylaxis   • Penicillins Anaphylaxis and Hives   • Sulfa Antibiotics Anaphylaxis, Hives and Rash   • Tramadol Anaphylaxis and Angioedema   • Terbinafine Other (See Comments) Transaminitis     Physical Exam    /86   Pulse 92   Temp 97.8 °F (36.6 °C) (Oral)   Resp 18   Ht 5' 11" (1.803 m)   Wt 126 kg (278 lb)   SpO2 97%   BMI 38.77 kg/m²     Constitutional: normal, well developed, well nourished, alert, in no distress and non-toxic and no overt pain behavior. and obese  Eyes: anicteric  HEENT: grossly intact  Neck: supple, symmetric, trachea midline and no masses   Pulmonary:even and unlabored  Cardiovascular:No edema or pitting edema present  Skin:Normal without rashes or lesions and well hydrated  Psychiatric:Mood and affect appropriate  Neurologic:Cranial Nerves II-XII grossly intact Sensation grossly intact; no clonus negative pena's. Reflexes 2+ and brisk. Spurling's maneuver positive left sided Musculoskeletal:normal gait. 5/5 strength bilaterally with AROM in upper extremities. Significant pain with cervical facet loading bilaterally and with lateral spine rotation, left greater than right. ttp over cervical paraspinal muscles. Negative derrek's test, negative gaenslen's negative SIJ loading bilaterally. Imaging    CT cervical spine noncontrast 2015    History: Cervical spine injury     Technique: Axial CT images of the cervical spine were performed without   intravenous contrast. Sagittal and coronal reconstructions were formatted. Comparison: None     Findings:  The craniocervical junction is intact. There is degenerative   narrowing of the predental space. The odontoid is intact. There is   straightening of the normal cervical lordosis. There is no subluxation. There   are mild multilevel spondylotic changes. There is bilateral neural foraminal   narrowing at C5-6 as a result of uncovertebral joint hypertrophy. CT is   suboptimal for evaluation of neural foramen and central canal, which can be   further evaluated with MRI. There is no prevertebral soft tissue swelling. No   fractures are identified.

## 2023-08-11 NOTE — TELEPHONE ENCOUNTER
Caller: Domitila Delvalle    Doctor: Dr Contreras Philip     Reason for call: Taz Borges called in to schedule f/u appt .  First available is 09/13/2023    Call back#: N/A

## 2023-08-15 ENCOUNTER — OFFICE VISIT (OUTPATIENT)
Dept: PHYSICAL THERAPY | Facility: CLINIC | Age: 53
End: 2023-08-15
Payer: COMMERCIAL

## 2023-08-15 DIAGNOSIS — M54.12 CERVICAL RADICULOPATHY: Primary | ICD-10-CM

## 2023-08-15 PROCEDURE — 97112 NEUROMUSCULAR REEDUCATION: CPT

## 2023-08-15 PROCEDURE — 97140 MANUAL THERAPY 1/> REGIONS: CPT

## 2023-08-15 PROCEDURE — 97110 THERAPEUTIC EXERCISES: CPT

## 2023-08-15 NOTE — PROGRESS NOTES
Daily Note     Today's date: 8/15/2023  Patient name: Ivy Julio  : 1970  MRN: [id number]  Referring provider: Deepthi Agrawal MD  Dx:   Encounter Diagnosis     ICD-10-CM    1. Cervical radiculopathy - Left  M54.12                      Subjective: Patient reports he received an epidural injection on Thursday. He says so far he has felt worse since. He notes his symptoms and pain were about a 2/10 baseline prior to the injection, and now he is about a 5 or 6/10. He notes he did use some ice last night and had significant neck pain and L UE radicular symptoms resolution. Objective: See treatment diary below      Assessment: Tolerated treatment well with decreased L UE radicular symptoms post session. Good tolerance to self stretched and manual STM with moderate relief in scapular region. Patient would benefit from continued PT to increase cervical mobility as well as resolve L UE radicular symptoms. Plan: Continue per plan of care.       Precautions: Pacemaker No ESTIM     Daily Treatment Diary:      Initial Evaluation Date: 23  Compliance 7/11  7/20  7/25  8/1  8/8  8/15           Visit Number 1 2  3  4  5  6           Re-Eval  IE   -  -  -          MC   Foto Captured Y   -  -  -                    7/11  7/20  7/25  8/1  8/8  8/15           Manual                      CT mobz -  L periscap stm 8 min    KL  10' stm scap, c/s 10' stm scap, c/s           Nerve gldies -  5m    self 5"x10                                     Ther-Ex                      C/s arom -  15x  15x ea  w/chin tuc 15x all  20x ea  20x ea           mhp -  np (prefers ice)  -                 butterflies 10x10"  10"x10  10x10"  10x10"  10x10"  10x10"           Arm lengthener -  10"x10  10x10"   20x 10"x10           Prayer stretch 4x30" 4x30"  4x30"   4x30"  4x30"  30"x4           Doorway stretch 4x30" 4x30"  4x30"  4x30"  4x30"  30"x4           Open books 10x10"  10"x10  10x10"   10x10"  10x10"  10"x10 Chin tuck - - Supine 10x10"  seated 3"x20  seated 5"20  seated 5"x20                                 ube - - 3'/3'  held radic sx    Wall slides as warm up 15x (pulleys in use)               Neuro Re-Ed                      Self caudal dist - - - - c lev scap 5x20"  c lev scap 5x20"                                                               Ther-Act                                                               Modalities                      TRACTION  nv 13# 15 min 18# 10' 20# 10" 22# 10' 22# 10'

## 2023-08-17 ENCOUNTER — TELEPHONE (OUTPATIENT)
Dept: PAIN MEDICINE | Facility: CLINIC | Age: 53
End: 2023-08-17

## 2023-08-21 NOTE — TELEPHONE ENCOUNTER
LYNNETTE-    S/w pt. Pt had a ARCENIO on 8/10 without relief. Pt is going to PT at present and plans to continue. Pt is currently taking Gabapentin and Cymbalta as prescribed with minimal relief. Pt does not want to increase dosages or take additional pain meds. Ibuprofen is helping. Pt also advised he can try ice or heat 20 mins on 20 mins off. Pt advised it sometimes takes the full 2 weeks to know the benefit of the procedure. Pt has a follow  ov scheduled.

## 2023-08-22 ENCOUNTER — APPOINTMENT (OUTPATIENT)
Dept: PHYSICAL THERAPY | Facility: CLINIC | Age: 53
End: 2023-08-22
Payer: COMMERCIAL

## 2023-08-22 NOTE — TELEPHONE ENCOUNTER
MD Liberty Morrow, RN; Michael Cobb 33 minutes ago (7:28 AM)      Can schedule for C6-C7 ILESI in one week; can cancel f/u thanks

## 2023-08-29 ENCOUNTER — OFFICE VISIT (OUTPATIENT)
Dept: PHYSICAL THERAPY | Facility: CLINIC | Age: 53
End: 2023-08-29
Payer: COMMERCIAL

## 2023-08-29 DIAGNOSIS — M54.12 CERVICAL RADICULOPATHY: Primary | ICD-10-CM

## 2023-08-29 PROCEDURE — 97110 THERAPEUTIC EXERCISES: CPT | Performed by: PHYSICAL THERAPIST

## 2023-08-29 PROCEDURE — 97140 MANUAL THERAPY 1/> REGIONS: CPT | Performed by: PHYSICAL THERAPIST

## 2023-08-29 NOTE — PROGRESS NOTES
Daily Note     Today's date: 2023  Patient name: Peace Baez  : 1970  MRN: [id number]  Referring provider: Jude Graff MD  Dx:   Encounter Diagnosis     ICD-10-CM    1. Cervical radiculopathy - Left  M54.12                      Subjective: Rich notes pain levels are since returned to baseline and feels it is improved. Objective: See treatment diary below      Assessment: Tolerated treatment well. Patient demonstrated fatigue post treatment, exhibited good technique with therapeutic exercises and would benefit from continued PT      Plan: Continue per plan of care. Progress treatment as tolerated.        Precautions: Pacemaker No ESTIM     Daily Treatment Diary:      Initial Evaluation Date: 23  Compliance 7/11  7/20  7/25  8/1  8/8  8/15  8/29         Visit Number 1 2  3  4  5  6  7         Re-Eval  IE   -  -  -    -      MC   Foto Captured Y   -  -  -                    7/11  7/20  7/25  8/1  8/8  8/15  8/29         Manual                      CT mobz -  L periscap stm 8 min    KL  10' stm scap, c/s 10' stm scap, c/s  10'         Nerve gldies -  5m    self 5"x10                                     Ther-Ex                      C/s arom -  15x  15x ea  w/chin tuc 15x all  20x ea  20x ea  20x ea c first rib depression         mhp -  np (prefers ice)  -                 butterflies 10x10"  10"x10  10x10"  10x10"  10x10"  10x10"  10x10"         Arm lengthener -  10"x10  10x10"   20x 10"x10  10x10"         Prayer stretch 4x30" 4x30"  4x30"   4x30"  4x30"  30"x4  4x30"         Doorway stretch 4x30" 4x30"  4x30"  4x30"  4x30"  30"x4  4x30"         Open books 10x10"  10"x10  10x10"   10x10"  10x10"  10"x10  10x10"         Chin tuck - - Supine 10x10"  seated 3"x20  seated 5"20  seated 5"x20  seated 5"x20                               ube - - 3'/3'  held radic sx    Wall slides as warm up 15x (pulleys in use)               Neuro Re-Ed                      Self caudal dist - - - - c lev scap 5x20"  c lev scap 5x20"  c lev wcap 5x20"                                                             Ther-Act                                                               Modalities                      TRACTION  nv 13# 15 min 18# 10' 20# 10" 22# 10' 22# 10' 22# 12'

## 2023-09-05 ENCOUNTER — OFFICE VISIT (OUTPATIENT)
Dept: PHYSICAL THERAPY | Facility: CLINIC | Age: 53
End: 2023-09-05
Payer: COMMERCIAL

## 2023-09-05 DIAGNOSIS — M54.12 CERVICAL RADICULOPATHY: Primary | ICD-10-CM

## 2023-09-05 PROCEDURE — 97110 THERAPEUTIC EXERCISES: CPT | Performed by: PHYSICAL THERAPIST

## 2023-09-05 PROCEDURE — 97140 MANUAL THERAPY 1/> REGIONS: CPT | Performed by: PHYSICAL THERAPIST

## 2023-09-05 PROCEDURE — 97112 NEUROMUSCULAR REEDUCATION: CPT | Performed by: PHYSICAL THERAPIST

## 2023-09-05 NOTE — PROGRESS NOTES
Daily Note     Today's date: 2023  Patient name: Harish Ordonez  : 1970  MRN: [id number]  Referring provider: Mony Garcia MD  Dx:   Encounter Diagnosis     ICD-10-CM    1. Cervical radiculopathy - Left  M54.12                      Subjective: Rich notes continued trend of improvement. Objective: See treatment diary below      Assessment: Tolerated treatment well. Patient demonstrated fatigue post treatment, exhibited good technique with therapeutic exercises and would benefit from continued PT      Plan: Continue per plan of care. Progress treatment as tolerated.        Precautions: Pacemaker No ESTIM     Daily Treatment Diary:      Initial Evaluation Date: 23  Compliance 7/11  7/20  7/25  8/1  8/8  8/15  8/29  9/5       Visit Number 1 2  3  4  5  6  7  8       Re-Eval  IE   -  -  -    -  -    MC   Foto Captured Y   -  -  -      -              7/11  7/20  7/25  8/1  8/8  8/15  8/29  9/5       Manual                      CT mobz -  L periscap stm 8 min    KL  10' stm scap, c/s 10' stm scap, c/s  10'  10'       Nerve gldies -  5m    self 5"x10                                     Ther-Ex                      C/s arom -  15x  15x ea  w/chin tuc 15x all  20x ea  20x ea  20x ea c first rib depression  20x ea c first rib mob       mhp -  np (prefers ice)  -                 butterflies 10x10"  10"x10  10x10"  10x10"  10x10"  10x10"  10x10"  10x10"       Arm lengthener -  10"x10  10x10"   20x 10"x10  10x10"  10x10"       Prayer stretch 4x30" 4x30"  4x30"   4x30"  4x30"  30"x4  4x30"  4x30"       Doorway stretch 4x30" 4x30"  4x30"  4x30"  4x30"  30"x4  4x30"  4x30"       Open books 10x10"  10"x10  10x10"   10x10"  10x10"  10"x10  10x10"  10x10"       Chin tuck - - Supine 10x10"  seated 3"x20  seated 5"20  seated 5"x20  seated 5"x20  seated 5x20                             ube - - 3'/3'  held radic sx    Wall slides as warm up 15x (pulleys in use)               Neuro Re-Ed Self caudal dist - - - - c lev scap 5x20"  c lev scap 5x20"  c lev wcap 5x20"  c lev scap 20x       No monies        20x     Tb abd c elevation        2-x                               Ther-Act                                                               Modalities                      TRACTION  nv 13# 15 min 18# 10' 20# 10" 22# 10' 22# 10' 22# 12'

## 2023-09-08 RX ORDER — ATORVASTATIN CALCIUM 20 MG/1
TABLET, FILM COATED ORAL
COMMUNITY
Start: 2023-08-09

## 2023-09-12 ENCOUNTER — OFFICE VISIT (OUTPATIENT)
Dept: PHYSICAL THERAPY | Facility: CLINIC | Age: 53
End: 2023-09-12
Payer: COMMERCIAL

## 2023-09-12 DIAGNOSIS — M54.12 CERVICAL RADICULOPATHY: Primary | ICD-10-CM

## 2023-09-12 PROCEDURE — 97110 THERAPEUTIC EXERCISES: CPT

## 2023-09-12 PROCEDURE — 97140 MANUAL THERAPY 1/> REGIONS: CPT

## 2023-09-12 PROCEDURE — 97112 NEUROMUSCULAR REEDUCATION: CPT

## 2023-09-12 NOTE — PROGRESS NOTES
Daily Note     Today's date: 2023  Patient name: Shana Franz  : 1970  MRN: [id number]  Referring provider: Romana Doffing, MD  Dx:   Encounter Diagnosis     ICD-10-CM    1. Cervical radiculopathy - Left  M54.12                      Subjective: Patient states he had some pain symptoms in L upper arm yesterday, but was able to perform self stretches and completely alleviate his pain. Notes he is feeling good today with no reports of pain. Objective: See treatment diary below      Assessment: Tolerated treatment well without complaint. Patient would benefit from continued PT to continue to reduce L sided symptoms and increase strength for improved function in ADLs. Plan: Continue per plan of care.       Precautions: Pacemaker No ESTIM     Daily Treatment Diary:      Initial Evaluation Date: 23  Compliance 7/11  7/20  7/25  8/1  8/8  8/15  8/29  9/5  9/12     Visit Number 1 2  3  4  5  6  7  8  9     Re-Eval  IE   -  -  -    -  -    MC   Foto Captured Y   -  -  -      -              7/11  7/20  7/25  8/1  8/8  8/15  8/29  9/5 9/12     Manual                      CT mobz -  L periscap stm 8 min    KL  10' stm scap, c/s 10' stm scap, c/s  10'  10'  10'     Nerve gldies -  5m    self 5"x10                                     Ther-Ex                      C/s arom -  15x  15x ea  w/chin tuc 15x all  20x ea  20x ea  20x ea c first rib depression  20x ea c first rib mob  20x ea c first rib mob     mhp -  np (prefers ice)  -                 butterflies 10x10"  10"x10  10x10"  10x10"  10x10"  10x10"  10x10"  10x10"  10"x10     Arm lengthener -  10"x10  10x10"   20x 10"x10  10x10"  10x10"  -     Prayer stretch 4x30" 4x30"  4x30"   4x30"  4x30"  30"x4  4x30"  4x30"  4x30"     Doorway stretch 4x30" 4x30"  4x30"  4x30"  4x30"  30"x4  4x30"  4x30"  4x30"     Open books 10x10"  10"x10  10x10"   10x10"  10x10"  10"x10  10x10"  10x10"  10"x10     Chin tuck - - Supine 10x10"  seated 3"x20  seated 5"20  seated 5"x20  seated 5"x20  seated 5x20   seated 5x20                           ube - - 3'/3'  held radic sx    Wall slides as warm up 15x (pulleys in use)          5'/5'     Neuro Re-Ed                      Self caudal dist - - - - c lev scap 5x20"  c lev scap 5x20"  c lev wcap 5x20"  c lev scap 20x  c lev scap 20x     No monies        20x gtb 20x    Tb abd c elevation        2-x gtb 20x                              Ther-Act                                                               Modalities                      TRACTION  nv 13# 15 min 18# 10' 20# 10" 22# 10' 22# 10' 22# 12'  22# 12'

## 2023-09-13 ENCOUNTER — OFFICE VISIT (OUTPATIENT)
Dept: PAIN MEDICINE | Facility: CLINIC | Age: 53
End: 2023-09-13
Payer: COMMERCIAL

## 2023-09-13 VITALS
HEIGHT: 71 IN | SYSTOLIC BLOOD PRESSURE: 142 MMHG | BODY MASS INDEX: 38.22 KG/M2 | HEART RATE: 82 BPM | TEMPERATURE: 96.9 F | WEIGHT: 273 LBS | OXYGEN SATURATION: 96 % | DIASTOLIC BLOOD PRESSURE: 92 MMHG

## 2023-09-13 DIAGNOSIS — M54.12 CERVICAL RADICULOPATHY: ICD-10-CM

## 2023-09-13 PROCEDURE — 99213 OFFICE O/P EST LOW 20 MIN: CPT | Performed by: ANESTHESIOLOGY

## 2023-09-13 RX ORDER — GABAPENTIN 300 MG/1
300 CAPSULE ORAL 3 TIMES DAILY
Qty: 90 CAPSULE | Refills: 2 | Status: SHIPPED | OUTPATIENT
Start: 2023-09-13

## 2023-09-13 NOTE — PROGRESS NOTES
Assessment  1. Cervical radiculopathy - Left  -     gabapentin (NEURONTIN) 300 mg capsule; Take 1 capsule (300 mg total) by mouth 3 (three) times a day    Greater than 90% relief of pain with improved ability to participate with IADLs after C7-T1 ILESI and continued PT with taking gabapentin 300mg TID. Previously reported the following symptomatology:     Left, greater than right sided cervical radicular pain in C6 and C7 dermatomal distribution accompanied by pain limited weakness numbness and paresthesias. Patient has not yet been a full participant with PT recently. Chronic pain with decreased participation with IADLs over the past few months. Has been taking NSAIDs and tylenol in addition to cymbalta with modest benefit. 5/5 strength bilaterally in upper extremities with AROM, positive spurling's maneuver left sided. Additionally there is positive cervical facet loading eliciting pain, left greater than right. Negative España's, denies any gait instability, saddle anesthesia. CT cervical spine from 2015 reviewed showing mild multilevel spondylotic changes. There is bilateral neural foraminal narrowing at C5-6 as a result of uncovertebral joint hypertrophy. No cord compression or cord signal abnormality. Risks, benefits alternatives to epidural steroid injections thoroughly discussed with patient. Handouts provided questions answered to patient's satisfaction. Lifestyle modifications extensively discussed including sleep hygiene, neck posture, diet, exercise and weight loss in conjunction with PT. Will proceed with multimodal pain therapy plan as noted below:    Plan  -f/u prn  -gabapentin 300 mg t.i.d. Ordered for patient; counseled regarding sedative effects of taking this medication and provided up titration calendar.   Counseled not to take medication while driving or operating heavy machinery/using stairs  -continue formal physical therapy for left sided cervical radiculopathy; Physician directed home exercise plan as per [employer] demonstrated and handouts provided that patient plans to participate with for 1 hour, twice a week for the next 6 weeks. There are risks associated with opioid medications, including dependence, addiction and tolerance. The patient understands and agrees to use these medications only as prescribed. Potential side effects of the medications include, but are not limited to, constipation, drowsiness, addiction, impaired judgment and risk of fatal overdose if not taken as prescribed. The patient was warned against driving while taking sedation medications or operating heavy machinery. The patient voiced understanding. Sharing medications is a felony. At this point in time, the patient is showing no signs of addiction, abuse, diversion or suicidal ideation. [place] Prescription Drug Monitoring Program report was reviewed and was appropriate      Complete risks and benefits including bleeding, infection, tissue reaction, nerve injury and allergic reaction were discussed. The approach was demonstrated using models and literature was provided. Verbal and written consent was obtained. My impressions and treatment recommendations were discussed in detail with the patient who verbalized understanding and had no further questions. Discharge instructions were provided. I personally saw and examined the patient and I agree with the above discussed plan of care. New Medications Ordered This Visit   Medications   • atorvastatin (LIPITOR) 20 mg tablet   • gabapentin (NEURONTIN) 300 mg capsule     Sig: Take 1 capsule (300 mg total) by mouth 3 (three) times a day     Dispense:  90 capsule     Refill:  2       History of Present Illness    Greater than 90% relief of pain with improved ability to participate with IADLs after C7-T1 ILESI and continued PT with taking gabapentin 300mg TID.  Previously reported the following symptomatology:     Kota Cary is a 52 y.o. male with pmhx of cardiomyopathy with pacemaker ICD, DM-2 (noninsulin dependent), HTN, XOL presenting with left sided cervical radicular pain in C6 and C7 dermatomal distribution accompanied by pain limited weakness numbness and paresthesias. Patient has not been a full participant with PT. Chronic pain with decreased participation with IADLs over the past few months. Has been taking NSAIDs and tylenol infrequently with modest benefit. The pain contributes to significant disability in participation with independent activities of daily living and is accompanied by pain limited weakness numbness and paresthesias that are debilitating in nature. The patient describes that overhead maneuvers such as combing hair is significantly limiting with respect to strength in the left arm/hand. He has trialed conservative measures including nsaids and tylenol for the pain but has not trialed any steroids or gabapentin. He has never had interventional pain procedures in the past including any cervical interlaminar epidural steroid injections in the past for his pain. He denies any saddle anesthesia, gait abnormality or bowel/bladder abnormality. I have personally reviewed and/or updated the patient's past medical history, past surgical history, family history, social history, current medications, allergies, and vital signs today. Review of Systems   Constitutional: Positive for activity change. HENT: Negative. Eyes: Negative. Respiratory: Negative. Cardiovascular: Negative. Gastrointestinal: Negative. Endocrine: Negative. Genitourinary: Negative. Musculoskeletal: Positive for arthralgias, myalgias, neck pain and neck stiffness. Negative for back pain and gait problem. Skin: Negative. Allergic/Immunologic: Negative. Neurological: Positive for weakness and numbness. Hematological: Negative. Psychiatric/Behavioral: Negative. All other systems reviewed and are negative.       Patient Active Problem List   Diagnosis   • Cervical radiculopathy   • Type 2 diabetes mellitus without complication, without long-term current use of insulin ([address])       Past Medical History:   Diagnosis Date   • Cardiomyopathy ([address])    • GERD (gastroesophageal reflux disease)    • Pacemaker 2013   • Spinal stenosis        Past Surgical History:   Procedure Laterality Date   • EPIDURAL BLOCK INJECTION N/A 8/10/2023    Procedure: C7-T1 ILESI;  Surgeon: Rockney Epley, MD;  Location: [facility];  Service: Pain Management        Family History   Problem Relation Age of Onset   • Colon cancer Mother    • Prostate cancer Father        Social History     Occupational History   • Not on file   Tobacco Use   • Smoking status: Never   • Smokeless tobacco: Never   Vaping Use   • Vaping Use: Never used   Substance and Sexual Activity   • Alcohol use: Yes     Comment: social   • Drug use: Never   • Sexual activity: Not on file       Current Outpatient Medications on File Prior to Visit   Medication Sig   • amLODIPine (NORVASC) 5 mg tablet Take 5 mg by mouth daily   • atorvastatin (LIPITOR) 20 mg tablet    • cyanocobalamin (VITAMIN B-12) 500 MCG tablet Take 500 mcg by mouth daily   • DULoxetine (CYMBALTA) 60 mg delayed release capsule Take 60 mg by mouth daily   • metFORMIN (GLUCOPHAGE) 500 mg tablet Take 1 tablet by mouth 2 (two) times a day with meals   • metoprolol succinate (TOPROL-XL) 50 mg 24 hr tablet 1.5 tablets daily   • Multiple Vitamins-Minerals (MULTIVITAMIN ADULT EXTRA C PO) Take by mouth   • oxybutynin (DITROPAN-XL) 10 MG 24 hr tablet Take 10 mg by mouth   • pantoprazole (PROTONIX) 40 mg tablet Take 1 tablet by mouth every evening   • TURMERIC PO Take 500 mg by mouth 2 (two) times a day   • valsartan (Diovan) 320 MG tablet Take 1 tablet by mouth daily   • VITAMIN E PO Take 400 Units by mouth   • [DISCONTINUED] gabapentin (NEURONTIN) 300 mg capsule Take 1 capsule (300 mg total) by mouth 3 (three) times a day   • atorvastatin (LIPITOR) 10 mg tablet Take 10 mg by mouth (Patient not taking: Reported on 9/13/2023)   • [DISCONTINUED] Lansoprazole (PREVACID PO) one daily   • [DISCONTINUED] lisinopril (ZESTRIL) 10 mg tablet  (Patient not taking: Reported on 6/14/2023)     No current facility-administered medications on file prior to visit. Allergies   Allergen Reactions   • Cyclobenzaprine Anaphylaxis   • Penicillins Anaphylaxis and Hives   • Sulfa Antibiotics Anaphylaxis, Hives and Rash   • Tramadol Anaphylaxis and Angioedema   • Terbinafine Other (See Comments)     Transaminitis     Physical Exam    /92 (BP Location: Left arm, Patient Position: Sitting, Cuff Size: Adult)   Pulse 82   Temp (!) 96.9 °F (36.1 °C)   Ht 5' 11" (1.803 m)   Wt 124 kg (273 lb)   SpO2 96%   BMI 38.08 kg/m²     Constitutional: normal, well developed, well nourished, alert, in no distress and non-toxic and no overt pain behavior. and obese  Eyes: anicteric  HEENT: grossly intact  Neck: supple, symmetric, trachea midline and no masses   Pulmonary:even and unlabored  Cardiovascular:No edema or pitting edema present  Skin:Normal without rashes or lesions and well hydrated  Psychiatric:Mood and affect appropriate  Neurologic:Cranial Nerves II-XII grossly intact Sensation grossly intact; no clonus negative pena's. Reflexes 2+ and brisk. Spurling's maneuver positive left sided Musculoskeletal:normal gait. 5/5 strength bilaterally with AROM in upper extremities. Significant pain with cervical facet loading bilaterally and with lateral spine rotation, left greater than right. ttp over cervical paraspinal muscles. Negative derrek's test, negative gaenslen's negative SIJ loading bilaterally. Imaging    CT cervical spine noncontrast 2015    History: Cervical spine injury     Technique: Axial CT images of the cervical spine were performed without   intravenous contrast. Sagittal and coronal reconstructions were formatted.      Comparison: None     Findings:  The craniocervical junction is intact. There is degenerative   narrowing of the predental space. The odontoid is intact. There is   straightening of the normal cervical lordosis. There is no subluxation. There   are mild multilevel spondylotic changes. There is bilateral neural foraminal   narrowing at C5-6 as a result of uncovertebral joint hypertrophy. CT is   suboptimal for evaluation of neural foramen and central canal, which can be   further evaluated with MRI. There is no prevertebral soft tissue swelling. No   fractures are identified.

## 2023-09-13 NOTE — PATIENT INSTRUCTIONS
Neck Exercises   AMBULATORY CARE:   Neck exercises  help reduce neck pain, and improve neck movement and strength. Neck exercises also help prevent long-term neck problems. Call your doctor if:   Your pain does not get better, or gets worse. You have questions or concerns about your condition, care, or exercise program.    What you need to know about exercise safety:   Move slowly, gently, and smoothly. Avoid fast or jerky motions. Stand and sit the way your healthcare provider shows you. Good posture may reduce your neck pain. Check your posture often, even when you are not doing your neck exercises. Follow the exercise program recommended by your healthcare provider. He or she will tell you which exercises are best for your condition. He or she will also tell you how many repetitions to do and how often you should do the exercises. How to perform neck exercises safely:   Exercise position:  You may sit or stand while you do neck exercises. Face forward. Your shoulders should be straight and relaxed, with a good posture. Head tilts, forward and back:  Gently bow your head and try to touch your chin to your chest. Your healthcare provider may tell you to push on the back of your neck to help bow your head. Raise your chin back to the starting position. Tilt your head back as far as possible so you are looking up at the ceiling. Your healthcare provider may tell you to lift your chin to help tilt your head back. Return your head to the starting position. Head tilts, side to side:  Tilt your head, bringing your ear toward your shoulder. Then tilt your head toward the other shoulder. Head turns:  Turn your head to look over your shoulder. Tilt your chin down and try to touch it to your shoulder. Do not raise your shoulder to your chin. Face forward again. Do the same on the other side.          Head rolls:  Slowly bring your chin toward your chest. Next, roll your head to the right. Your ear should be positioned over your shoulder. Hold this position for 5 seconds. Roll your head back toward your chest and to the left into the same position. Hold for 5 seconds. Gently roll your head back and around in a clockwise [place] 3 times. Next, move your head in the reverse direction (counterclockwise) in a [place] 3 times. Do not shrug your shoulders upwards while you do this exercise. Follow up with your doctor as directed:  Write down your questions so you remember to ask them during your visits. © Copyright [facility] 2022 Information is for End User's use only and may not be sold, redistributed or otherwise used for commercial purposes. The above information is an  only. It is not intended as medical advice for individual conditions or treatments. Talk to your doctor, nurse or pharmacist before following any medical regimen to see if it is safe and effective for you.

## 2023-09-19 ENCOUNTER — OFFICE VISIT (OUTPATIENT)
Dept: PHYSICAL THERAPY | Facility: CLINIC | Age: 53
End: 2023-09-19
Payer: COMMERCIAL

## 2023-09-19 DIAGNOSIS — M54.12 CERVICAL RADICULOPATHY: Primary | ICD-10-CM

## 2023-09-19 PROCEDURE — 97112 NEUROMUSCULAR REEDUCATION: CPT

## 2023-09-19 PROCEDURE — 97530 THERAPEUTIC ACTIVITIES: CPT

## 2023-09-19 PROCEDURE — 97110 THERAPEUTIC EXERCISES: CPT

## 2023-09-19 NOTE — PROGRESS NOTES
Daily Note     Today's date: 2023  Patient name: Monica Sandoval  : 1970  MRN: [id number]  Referring provider: Loly Espitia MD  Dx:   Encounter Diagnosis     ICD-10-CM    1. Cervical radiculopathy - Left  M54.12                      Subjective: Patient notes f/u with pain management went well and he states dr said whenever he feels ready to [address], he is able to. He states he has been feeling good and feels ready to transition to HEP. Objective: See treatment diary below      Assessment: Tolerated treatment well without issue. Provided patient with updated HEP printout and discussed- he verbalized understanding. Patient would benefit from DC to independent HEP to maintain function. Plan: DC to HEP.      Precautions: Pacemaker No ESTIM     Daily Treatment Diary:      Initial Evaluation Date: 23  Compliance 7/11  7/20  7/25  8/1  8/8  8/15  8/29  9/5  9/12  9/19   Visit Number 1 2  3  4  5  6  7  8  9  10   Re-Eval  IE   -  -  -    -  -      Foto Captured Y   -  -  -      -    y          7/11  7/20  7/25  8/1  8/8  8/15  8/29  9/5 9/12  9/19   Manual                      CT mobz -  L periscap stm 8 min    KL  10' stm scap, c/s 10' stm scap, c/s  10'  10'  10' -   Nerve gldies -  5m    self 5"x10                                     Ther-Ex                      C/s arom -  15x  15x ea  w/chin tuc 15x all  20x ea  20x ea  20x ea c first rib depression  20x ea c first rib mob  20x ea c first rib mob  20x ea c first rib mob   mhp -  np (prefers ice)  -                 butterflies 10x10"  10"x10  10x10"  10x10"  10x10"  10x10"  10x10"  10x10"  10"x10  10"x10   Arm lengthener -  10"x10  10x10"   20x 10"x10  10x10"  10x10"  -     Prayer stretch 4x30" 4x30"  4x30"   4x30"  4x30"  30"x4  4x30"  4x30"  4x30"  4x30"   Doorway stretch 4x30" 4x30"  4x30"  4x30"  4x30"  30"x4  4x30"  4x30"  4x30"  4x30"   Open books 10x10"  10"x10  10x10"   10x10"  10x10"  10"x10  10x10"  10x10"  10"x10 10"x10   Chin tuck - - Supine 10x10"  seated 3"x20  seated 5"20  seated 5"x20  seated 5"x20  seated 5x20   seated 5x20  seated 5x20                         ube - - 3'/3'  held radic sx    Wall slides as warm up 15x (pulleys in use)          5'/5'  5'/5'   Neuro Re-Ed                      Self caudal dist - - - - c lev scap 5x20"  c lev scap 5x20"  c lev wcap 5x20"  c lev scap 20x  c lev scap 20x  c lev sc 10"x10   No monies        20x gtb 20x gtb 20x   Tb abd c elevation        2-x gtb 20x gtb 20x                             Ther-Act                                                               Modalities                      TRACTION  nv 13# 15 min 18# 10' 20# 10" 22# 10' 22# 10' 22# 12'  22# 12' -

## 2023-09-26 ENCOUNTER — APPOINTMENT (OUTPATIENT)
Dept: PHYSICAL THERAPY | Facility: CLINIC | Age: 53
End: 2023-09-26
Payer: COMMERCIAL

## 2025-06-26 ENCOUNTER — ANESTHESIA EVENT (OUTPATIENT)
Dept: ANESTHESIOLOGY | Facility: HOSPITAL | Age: 55
End: 2025-06-26

## 2025-06-26 ENCOUNTER — ANESTHESIA (OUTPATIENT)
Dept: ANESTHESIOLOGY | Facility: HOSPITAL | Age: 55
End: 2025-06-26

## 2025-06-26 NOTE — ANESTHESIA PREPROCEDURE EVALUATION
Procedure:  PRE-OP ONLY    Relevant Problems   ENDO   (+) Type 2 diabetes mellitus without complication, without long-term current use of insulin (HCC)      •  Left Ventricle: Left ventricle is normal in size. Interventricular   septum is minimally thickened. Systolic function is normal with an   ejection fraction of 55%. There is normal diastolic function.   •  Right Ventricle: Systolic function is normal. Device lead noted in the   right atrium and right ventricle.   •  Compared to previous study, no significant change.       Physical Exam    Airway     Mallampati score: II  TM Distance: >3 FB  Neck ROM: full  Mouth opening: >= 4 cm      Cardiovascular  Cardiovascular exam normal    Dental   No notable dental hx     Pulmonary  Pulmonary exam normal     Neurological  - normal exam    Other Findings      Anesthesia Plan  ASA Score- 2     Anesthesia Type- IV sedation with anesthesia with ASA Monitors.         Additional Monitors:     Airway Plan:            Plan Factors-    Induction-     Postoperative Plan-         Informed Consent-       NPO Status:  No vitals data found for the desired time range.

## 2025-06-27 ENCOUNTER — ANESTHESIA EVENT (OUTPATIENT)
Dept: GASTROENTEROLOGY | Facility: HOSPITAL | Age: 55
End: 2025-06-27
Payer: COMMERCIAL

## 2025-06-27 ENCOUNTER — HOSPITAL ENCOUNTER (OUTPATIENT)
Dept: GASTROENTEROLOGY | Facility: HOSPITAL | Age: 55
Setting detail: OUTPATIENT SURGERY
End: 2025-06-27
Attending: HOSPITALIST
Payer: COMMERCIAL

## 2025-06-27 ENCOUNTER — ANESTHESIA (OUTPATIENT)
Dept: GASTROENTEROLOGY | Facility: HOSPITAL | Age: 55
End: 2025-06-27
Payer: COMMERCIAL

## 2025-06-27 VITALS
OXYGEN SATURATION: 99 % | HEART RATE: 72 BPM | SYSTOLIC BLOOD PRESSURE: 127 MMHG | DIASTOLIC BLOOD PRESSURE: 71 MMHG | RESPIRATION RATE: 23 BRPM | TEMPERATURE: 97.8 F

## 2025-06-27 DIAGNOSIS — Z12.11 ENCOUNTER FOR SCREENING FOR MALIGNANT NEOPLASM OF COLON: ICD-10-CM

## 2025-06-27 LAB — GLUCOSE SERPL-MCNC: 91 MG/DL (ref 65–140)

## 2025-06-27 PROCEDURE — 82948 REAGENT STRIP/BLOOD GLUCOSE: CPT

## 2025-06-27 PROCEDURE — 88305 TISSUE EXAM BY PATHOLOGIST: CPT | Performed by: STUDENT IN AN ORGANIZED HEALTH CARE EDUCATION/TRAINING PROGRAM

## 2025-06-27 RX ORDER — LIDOCAINE HYDROCHLORIDE 10 MG/ML
INJECTION, SOLUTION EPIDURAL; INFILTRATION; INTRACAUDAL; PERINEURAL AS NEEDED
Status: DISCONTINUED | OUTPATIENT
Start: 2025-06-27 | End: 2025-06-27

## 2025-06-27 RX ORDER — PROPOFOL 10 MG/ML
INJECTION, EMULSION INTRAVENOUS AS NEEDED
Status: DISCONTINUED | OUTPATIENT
Start: 2025-06-27 | End: 2025-06-27

## 2025-06-27 RX ORDER — SODIUM CHLORIDE, SODIUM LACTATE, POTASSIUM CHLORIDE, CALCIUM CHLORIDE 600; 310; 30; 20 MG/100ML; MG/100ML; MG/100ML; MG/100ML
125 INJECTION, SOLUTION INTRAVENOUS CONTINUOUS
OUTPATIENT
Start: 2025-06-27

## 2025-06-27 RX ORDER — SODIUM CHLORIDE, SODIUM LACTATE, POTASSIUM CHLORIDE, CALCIUM CHLORIDE 600; 310; 30; 20 MG/100ML; MG/100ML; MG/100ML; MG/100ML
INJECTION, SOLUTION INTRAVENOUS CONTINUOUS PRN
Status: DISCONTINUED | OUTPATIENT
Start: 2025-06-27 | End: 2025-06-27

## 2025-06-27 RX ADMIN — SODIUM CHLORIDE, SODIUM LACTATE, POTASSIUM CHLORIDE, AND CALCIUM CHLORIDE: .6; .31; .03; .02 INJECTION, SOLUTION INTRAVENOUS at 10:32

## 2025-06-27 RX ADMIN — PROPOFOL 30 MG: 10 INJECTION, EMULSION INTRAVENOUS at 11:12

## 2025-06-27 RX ADMIN — PROPOFOL 20 MG: 10 INJECTION, EMULSION INTRAVENOUS at 11:23

## 2025-06-27 RX ADMIN — SODIUM CHLORIDE, SODIUM LACTATE, POTASSIUM CHLORIDE, AND CALCIUM CHLORIDE: .6; .31; .03; .02 INJECTION, SOLUTION INTRAVENOUS at 11:04

## 2025-06-27 RX ADMIN — PROPOFOL 30 MG: 10 INJECTION, EMULSION INTRAVENOUS at 11:14

## 2025-06-27 RX ADMIN — PROPOFOL 130 MG: 10 INJECTION, EMULSION INTRAVENOUS at 11:08

## 2025-06-27 RX ADMIN — LIDOCAINE HYDROCHLORIDE 50 MG: 10 INJECTION, SOLUTION EPIDURAL; INFILTRATION; INTRACAUDAL at 11:08

## 2025-06-27 RX ADMIN — PROPOFOL 30 MG: 10 INJECTION, EMULSION INTRAVENOUS at 11:10

## 2025-06-27 RX ADMIN — PROPOFOL 20 MG: 10 INJECTION, EMULSION INTRAVENOUS at 11:17

## 2025-06-27 NOTE — H&P
Procedure(s):  Colonoscopy with indication(s) of CRC screening  Esophagogastroduodenuoscopy with the indication(s) of      Vitals:    06/27/25 1012   BP: 117/73   Pulse: 78   Resp: 16   Temp: 98.8 °F (37.1 °C)   SpO2: 97%       Physical Exam:  Physical Exam  HENT:      Nose: Nose normal.     Eyes:      Conjunctiva/sclera: Conjunctivae normal.       Cardiovascular:      Rate and Rhythm: Normal rate.   Pulmonary:      Effort: Pulmonary effort is normal.   Abdominal:      Palpations: Abdomen is soft.     Neurological:      General: No focal deficit present.      Mental Status: He is alert.     Psychiatric:         Mood and Affect: Mood normal.              Endoscopy Pre-Procedure Assessment:  Prior to the procedure, the patient is identified.  The patient's history, medications, and allergies have been reviewed.  The patient is competent.     Consent:    We have discussed the procedure in detail. We reviewed risks, benefits and alternative as well as potentional complications including and not limited to missed lesion or polyp,medication side effect , infection, bleeding, perforation and the potential need for surgery, ICU admission, CPR, as well as the need for blood product transfusion. Patient verbalized understanding and agreement. All patient questions were answered.     After reviewed the risks and benefits, the patient is deemed in satisfactory condition to undergo the procedure.  The anesthesia plan is to use monitored anesthesia care (MAC).      06/27/25

## 2025-06-27 NOTE — ANESTHESIA PREPROCEDURE EVALUATION
Procedure:  COLONOSCOPY    Relevant Problems   ENDO   (+) Type 2 diabetes mellitus without complication, without long-term current use of insulin (HCC)        Left Ventricle: Left ventricle is normal in size. Interventricular   septum is minimally thickened. Systolic function is normal with an   ejection fraction of 55%. There is normal diastolic function.     Right Ventricle: Systolic function is normal. Device lead noted in the   right atrium and right ventricle.     Compared to previous study, no significant change.       Physical Exam    Airway     Mallampati score: II  TM Distance: >3 FB  Neck ROM: full  Mouth opening: >= 4 cm      Cardiovascular  Cardiovascular exam normal    Dental   No notable dental hx     Pulmonary  Pulmonary exam normal     Neurological  - normal exam    Other Findings        Anesthesia Plan  ASA Score- 2     Anesthesia Type- IV sedation with anesthesia with ASA Monitors.         Additional Monitors:     Airway Plan: natural airway.           Plan Factors-Exercise tolerance (METS): >4 METS.    Chart reviewed. EKG reviewed. Imaging results reviewed. Existing labs reviewed. Patient summary reviewed.    Patient is not a current smoker.  Patient did not smoke on day of surgery.    Obstructive sleep apnea risk education given perioperatively.        Induction-     Postoperative Plan- .   Monitoring Plan - Monitoring plan - standard ASA monitoring      Perioperative Resuscitation Plan - Level 1 - Full Code.       Informed Consent- Anesthetic plan and risks discussed with patient.  I personally reviewed this patient with the CRNA. Discussed and agreed on the Anesthesia Plan with the CRNA..      NPO Status:  Vitals Value Taken Time   Date of last liquid 06/27/25 06/27/25 10:11   Time of last liquid 0400 06/27/25 10:11   Date of last solid 06/25/25 06/27/25 10:11   Time of last solid 1600 06/27/25 10:11

## 2025-06-27 NOTE — ANESTHESIA POSTPROCEDURE EVALUATION
Post-Op Assessment Note    CV Status:  Stable    Pain management: satisfactory to patient       Mental Status:  Alert and awake   Hydration Status:  Stable   PONV Controlled:  None   Airway Patency:  Patent     Post Op Vitals Reviewed: Yes    No anethesia notable event occurred.    Staff: CRNA           Last Filed PACU Vitals:  Vitals Value Taken Time   Temp 97.2    Pulse 76    /67    Resp 14    SpO2 99

## 2025-07-02 PROCEDURE — 88305 TISSUE EXAM BY PATHOLOGIST: CPT | Performed by: STUDENT IN AN ORGANIZED HEALTH CARE EDUCATION/TRAINING PROGRAM

## (undated) DEVICE — NEEDLE BLUNT 18 G X 1 1/2IN

## (undated) DEVICE — PLASTIC ADHESIVE BANDAGE: Brand: CURITY

## (undated) DEVICE — SYRINGE 10ML LL

## (undated) DEVICE — SYRINGE LOR 8ML PLASTIC LL

## (undated) DEVICE — GLOVE INDICATOR PI UNDERGLOVE SZ 7.5 BLUE

## (undated) DEVICE — DRAPE SHEET THREE QUARTER

## (undated) DEVICE — SYRINGE 5ML LL

## (undated) DEVICE — NEEDLE 25G X 1 1/2

## (undated) DEVICE — GAUZE SPONGES,USP TYPE VII GAUZE, 12 PLY: Brand: CURITY

## (undated) DEVICE — TRAY EPID CONT PERIFIX 18G X 3.5IN 10ML CLSD TIP

## (undated) DEVICE — IV EXTENSION TUBING SMALL BORE

## (undated) DEVICE — CHLORAPREP HI-LITE 10.5ML ORANGE